# Patient Record
Sex: FEMALE | Race: BLACK OR AFRICAN AMERICAN | Employment: UNEMPLOYED | URBAN - METROPOLITAN AREA
[De-identification: names, ages, dates, MRNs, and addresses within clinical notes are randomized per-mention and may not be internally consistent; named-entity substitution may affect disease eponyms.]

---

## 2022-06-15 ENCOUNTER — TELEPHONE (OUTPATIENT)
Dept: INTERNAL MEDICINE CLINIC | Age: 27
End: 2022-06-15

## 2022-06-15 NOTE — TELEPHONE ENCOUNTER
TRIED TO CALL PT TO MAKE SURE SHE WANTED TO EST IN [place] SINCE OVER AN HOUR AWAY BUT NOT A GOOD PHONE #--